# Patient Record
Sex: FEMALE | Race: AMERICAN INDIAN OR ALASKA NATIVE
[De-identification: names, ages, dates, MRNs, and addresses within clinical notes are randomized per-mention and may not be internally consistent; named-entity substitution may affect disease eponyms.]

---

## 2022-07-11 ENCOUNTER — HOSPITAL ENCOUNTER (EMERGENCY)
Dept: HOSPITAL 5 - ED | Age: 37
LOS: 1 days | Discharge: HOME | End: 2022-07-12
Payer: COMMERCIAL

## 2022-07-11 DIAGNOSIS — N28.9: ICD-10-CM

## 2022-07-11 DIAGNOSIS — N39.0: Primary | ICD-10-CM

## 2022-07-11 DIAGNOSIS — Z79.899: ICD-10-CM

## 2022-07-11 DIAGNOSIS — Z91.09: ICD-10-CM

## 2022-07-11 DIAGNOSIS — R10.9: ICD-10-CM

## 2022-07-11 DIAGNOSIS — G43.909: ICD-10-CM

## 2022-07-11 DIAGNOSIS — N83.202: ICD-10-CM

## 2022-07-11 DIAGNOSIS — Z91.013: ICD-10-CM

## 2022-07-11 PROCEDURE — 80053 COMPREHEN METABOLIC PANEL: CPT

## 2022-07-11 PROCEDURE — 87086 URINE CULTURE/COLONY COUNT: CPT

## 2022-07-11 PROCEDURE — 76830 TRANSVAGINAL US NON-OB: CPT

## 2022-07-11 PROCEDURE — 96361 HYDRATE IV INFUSION ADD-ON: CPT

## 2022-07-11 PROCEDURE — 76856 US EXAM PELVIC COMPLETE: CPT

## 2022-07-11 PROCEDURE — 36415 COLL VENOUS BLD VENIPUNCTURE: CPT

## 2022-07-11 PROCEDURE — 76705 ECHO EXAM OF ABDOMEN: CPT

## 2022-07-11 PROCEDURE — 96374 THER/PROPH/DIAG INJ IV PUSH: CPT

## 2022-07-11 PROCEDURE — 99284 EMERGENCY DEPT VISIT MOD MDM: CPT

## 2022-07-11 PROCEDURE — 85025 COMPLETE CBC W/AUTO DIFF WBC: CPT

## 2022-07-11 PROCEDURE — 81001 URINALYSIS AUTO W/SCOPE: CPT

## 2022-07-11 PROCEDURE — 71046 X-RAY EXAM CHEST 2 VIEWS: CPT

## 2022-07-11 PROCEDURE — 74176 CT ABD & PELVIS W/O CONTRAST: CPT

## 2022-07-11 PROCEDURE — 96375 TX/PRO/DX INJ NEW DRUG ADDON: CPT

## 2022-07-11 PROCEDURE — 83690 ASSAY OF LIPASE: CPT

## 2022-07-11 NOTE — XRAY REPORT
XR chest routine 2V



INDICATION / CLINICAL INFORMATION: sob and fever.



COMPARISON: None available.



FINDINGS: Findings in the chest are accentuated by body habitus and phase of inspiration.



SUPPORT DEVICES: None.

HEART /PULMONARY VASCULATURE: No significant abnormality. 

LUNGS / PLEURA: No acute pulmonary or pleural abnormality. No pneumothorax. 



ADDITIONAL FINDINGS: No significant additional findings.



IMPRESSION:

1. No acute findings.



Signer Name: Zev Mccauley MD 

Signed: 7/11/2022 8:18 PM

Workstation Name: [email or web address]

## 2022-07-12 VITALS — DIASTOLIC BLOOD PRESSURE: 72 MMHG | SYSTOLIC BLOOD PRESSURE: 131 MMHG

## 2022-07-12 LAB
ALBUMIN SERPL-MCNC: 3.9 G/DL (ref 3.9–5)
ALT SERPL-CCNC: 8 UNITS/L (ref 7–56)
BACTERIA #/AREA URNS HPF: (no result) /HPF
BASOPHILS # (AUTO): 0.1 K/MM3 (ref 0–0.1)
BASOPHILS NFR BLD AUTO: 0.5 % (ref 0–1.8)
BILIRUB UR QL STRIP: (no result)
BLOOD UR QL VISUAL: (no result)
BUN SERPL-MCNC: 8 MG/DL (ref 7–17)
BUN/CREAT SERPL: 8 %
CALCIUM SERPL-MCNC: 8.9 MG/DL (ref 8.4–10.2)
EOSINOPHIL # BLD AUTO: 0.1 K/MM3 (ref 0–0.4)
EOSINOPHIL NFR BLD AUTO: 1.4 % (ref 0–4.3)
HCT VFR BLD CALC: 32.6 % (ref 30.3–42.9)
HEMOLYSIS INDEX: 3
HGB BLD-MCNC: 10.3 GM/DL (ref 10.1–14.3)
LYMPHOCYTES # BLD AUTO: 2.2 K/MM3 (ref 1.2–5.4)
LYMPHOCYTES NFR BLD AUTO: 22.5 % (ref 13.4–35)
MCHC RBC AUTO-ENTMCNC: 32 % (ref 30–34)
MCV RBC AUTO: 77 FL (ref 79–97)
MONOCYTES # (AUTO): 0.7 K/MM3 (ref 0–0.8)
MONOCYTES % (AUTO): 6.5 % (ref 0–7.3)
MUCOUS THREADS #/AREA URNS HPF: (no result) /HPF
PH UR STRIP: 5 [PH] (ref 5–7)
PLATELET # BLD: 362 K/MM3 (ref 140–440)
RBC # BLD AUTO: 4.27 M/MM3 (ref 3.65–5.03)
RBC #/AREA URNS HPF: 35 /HPF (ref 0–6)
UROBILINOGEN UR-MCNC: 2 MG/DL (ref ?–2)
WBC #/AREA URNS HPF: 10 /HPF (ref 0–6)

## 2022-07-12 NOTE — EMERGENCY DEPARTMENT REPORT
ED General Adult HPI





- General


Chief complaint: Upper Respiratory Infection


Stated complaint: SEVER ABDOMINAL PAIN, NUMBNESS IN FINGERS


Time Seen by Provider: 07/12/22 07:23


Source: patient


Mode of arrival: Ambulatory


Limitations: No Limitations





- History of Present Illness


Initial comments: 





36-year-old female past medical history of a brain cyst diagnosed in 07, 

endometriosis, PCOS, renal disease "born with 1 kidney" and a heart murmur.  

Patient reports to the ER with complaints of abdominal pain all over with nausea

and vomiting that started yesterday around 10 AM.  Patient states that initially

when she came to the ER her pain was a 10 out of 10 the pain has now decreased 

to 6 out of 10.  Patient denies diarrhea.  Patient also reports that headache 

reports she has not taken her propanolol 80 mg today for her migraines.  Patient

denies chest pain, shortness of breath, weakness, dizziness.  No other acute 

concerns at this time.


Severity scale (0 -10): 9





- Related Data


                                  Previous Rx's











 Medication  Instructions  Recorded  Last Taken  Type


 


Cefuroxime Axetil [Cefuroxime] 500 mg PO BID 7 Days #14 tab 07/12/22 Unknown Rx


 


Ondansetron [Zofran Odt] 4 mg PO Q12HR PRN 4 Days #8 07/12/22 Unknown Rx





 tab.rapdis   


 


traMADoL [Ultram 50 MG tab] 50 mg PO Q6HR PRN 3 Days #10 tablet 07/12/22 Unknown

Rx











                                    Allergies











Allergy/AdvReac Type Severity Reaction Status Date / Time


 


iodine Allergy  Anaphylaxis Verified 07/11/22 18:27


 


shellfish derived Allergy  Anaphylaxis Verified 07/11/22 18:26














ED Review of Systems


ROS: 


Stated complaint: SEVER ABDOMINAL PAIN, NUMBNESS IN FINGERS


Other details as noted in HPI





Comment: All other systems reviewed and negative


Gastrointestinal: abdominal pain, nausea, vomiting.  denies: diarrhea, 

constipation, hematemesis





ED Past Medical Hx





- Past Medical History


Previous Medical History?: Yes


Hx Renal Disease: Yes


Hx Headaches / Migraines: Yes (migraine)


Additional medical history: brain cyst, heart murmur, endometriosis, PCOS,





- Surgical History


Additional Surgical History: 9 GYN SX, hernia repair





- Social History


Smoking Status: Never Smoker





- Medications


Home Medications: 


                                Home Medications











 Medication  Instructions  Recorded  Confirmed  Last Taken  Type


 


Cefuroxime Axetil [Cefuroxime] 500 mg PO BID 7 Days #14 tab 07/12/22  Unknown Rx


 


Ondansetron [Zofran Odt] 4 mg PO Q12HR PRN 4 Days #8 07/12/22  Unknown Rx





 tab.rapdis    


 


traMADoL [Ultram 50 MG tab] 50 mg PO Q6HR PRN 3 Days #10 tablet 07/12/22  

Unknown Rx














ED Physical Exam





- General


Limitations: No Limitations


General appearance: alert, in no apparent distress





- Head


Head exam: Present: atraumatic, normocephalic





- Eye


Eye exam: Present: normal appearance





- ENT


ENT exam: Present: mucous membranes moist





- Neck


Neck exam: Present: normal inspection





- Respiratory


Respiratory exam: Present: normal lung sounds bilaterally.  Absent: respiratory 

distress





- Cardiovascular


Cardiovascular Exam: Present: regular rate, normal rhythm.  Absent: systolic 

murmur, diastolic murmur, rubs, gallop





- GI/Abdominal


GI/Abdominal exam: Present: soft, tenderness, diminished bowel sounds, other (No

acute abdominal signs.).  Absent: distended, rebound, rigid





- Extremities Exam


Extremities exam: Present: normal inspection





- Back Exam


Back exam: Present: normal inspection





- Neurological Exam


Neurological exam: Present: alert, oriented X3





- Psychiatric


Psychiatric exam: Present: normal affect, normal mood





- Skin


Skin exam: Present: warm, dry, intact, normal color.  Absent: rash





ED Course


                                   Vital Signs











  07/11/22 07/12/22 07/12/22





  18:19 05:29 09:41


 


Temperature 100.6 F H 98.5 F 


 


Pulse Rate 88 92 H 62


 


Respiratory 22 19 





Rate   


 


Blood Pressure 151/87  135/74


 


Blood Pressure  116/58 





[Left]   


 


O2 Sat by Pulse 100 95 





Oximetry   














  07/12/22 07/12/22





  10:53 12:08


 


Temperature  98.9 F


 


Pulse Rate 59 L 56 L


 


Respiratory 14 14





Rate  


 


Blood Pressure  


 


Blood Pressure 139/59 131/72





[Left]  


 


O2 Sat by Pulse 100 96





Oximetry  














ED Medical Decision Making





- Lab Data


Result diagrams: 


                                 07/12/22 05:24





                                 07/12/22 05:24





- Radiology Data





chest -  IMPRESSION:  


 1. No acute findings.  





ct abdominal/ pelvic -  IMPRESSION:  


 1. Negative for obstruction or localized inflammation.  


 2. Previous right nephrectomy.  


 3. Probable small cyst left ovary. 





Abdominal U/S -  IMPRESSION:  


 1. No significant sonographic abnormality of the right upper quadrant.





pelvis US -  IMPRESSION:  


  Limited difficult exam by the technologist.  


 2 simple appearing left ovarian cysts are identified as described.  


 There is suggestion of complex thickening or mass lesion in the lower uterine 

segment or possibly 


the endocervical canal as described above. Although this may represent blood 

products, A mass lesion


or endometrioma is difficult to exclude. Given the complex history of this 

patient, further 


evaluation with MRI pelvis with and without contrast should be considered.  





- Medical Decision Making





36-year-old female past medical history of a brain cyst diagnosed in 07, 

endometriosis, PCOS, renal disease "born with 1 kidney" and a heart murmur.  

Patient reports to the ER with complaints of abdominal pain all over with nausea

 and vomiting that started yesterday around 10 AM.  Patient states that 

initially when she came to the ER her pain was a 10 out of 10 the pain has now 

decreased to 6 out of 10.  Patient denies diarrhea.  Patient also reports that 

headache reports she has not taken her propanolol 80 mg today for her migraines.

  Patient denies chest pain, shortness of breath, weakness, dizziness.  No other

 acute concerns at this time.





On physical exam there is abdominal tenderness with lower abdominal pain and 

greatest pain and compared to upper abdominal pain.  No acute abdominal signs 

noted on physical exam.  Bowels are hypoactive.  No pulsating mass no organ 

enlargement noted.





CT was ordered, ultrasound, blood work.  Pain medicine and Zofran medicine 

ordered.  Propanolol 80 mg was ordered for patient's headache per patient's 

request.





Patient reports feeling better after receiving IV medication.


Patient reports increasing pains around a 4 out of 10.  Patient updated on her 

CT and ultrasound results and below work.  Patient reports that she is aware 

that she does have a cyst on her ovaries.


Patient informed that she should follow-up with her primary care provider as 

well as her OB/GYN for an MRI for further evaluation of her uterus.  Patient 

agrees with plan of care and verbalized understanding.


Patient stable for discharge home.


Patient informed that if symptoms continue or worsen to report back to the ER.

















Labs











  07/12/22 07/12/22 07/12/22





  05:24 05:24 05:31


 


WBC  10.0  


 


RBC  4.27  


 


Hgb  10.3  


 


Hct  32.6  


 


MCV  77 L  


 


MCH  24 L  


 


MCHC  32  


 


RDW  16.9 H  


 


Plt Count  362  


 


Lymph % (Auto)  22.5  


 


Mono % (Auto)  6.5  


 


Eos % (Auto)  1.4  


 


Baso % (Auto)  0.5  


 


Lymph # (Auto)  2.2  


 


Mono # (Auto)  0.7  


 


Eos # (Auto)  0.1  


 


Baso # (Auto)  0.1  


 


Seg Neutrophils %  69.1  


 


Seg Neutrophils #  6.9  


 


Sodium   138 


 


Potassium   3.8 


 


Chloride   102.2 


 


Carbon Dioxide   25 


 


Anion Gap   15 


 


BUN   8 


 


Creatinine   1.0 


 


Estimated GFR   > 60 


 


BUN/Creatinine Ratio   8 


 


Glucose   108 H 


 


Calcium   8.9 


 


Total Bilirubin   0.90 


 


AST   12 


 


ALT   8 


 


Alkaline Phosphatase   66 


 


Total Protein   7.6 


 


Albumin   3.9 


 


Albumin/Globulin Ratio   1.1 


 


Lipase   19 


 


Urine Color    Yellow


 


Urine Turbidity    Slightly-cloudy


 


Urine pH    5.0


 


Ur Specific Gravity    1.025


 


Urine Protein    30 mg/dl


 


Urine Glucose (UA)    Neg


 


Urine Ketones    Neg


 


Urine Blood    Mod


 


Urine Nitrite    Neg


 


Urine Bilirubin    Neg


 


Urine Urobilinogen    2.0


 


Ur Leukocyte Esterase    Mod


 


Urine WBC (Auto)    10.0 H


 


Urine RBC (Auto)    35.0


 


U Epithel Cells (Auto)    25.0 H


 


Urine Bacteria (Auto)    1+


 


Urine Mucus    1+











                                   Vital Signs











  07/11/22 07/12/22 07/12/22





  18:19 05:29 09:41


 


Temperature 100.6 F H 98.5 F 


 


Pulse Rate 88 92 H 62


 


Respiratory 22 19 





Rate   


 


Blood Pressure 151/87  135/74


 


Blood Pressure  116/58 





[Left]   


 


O2 Sat by Pulse 100 95 





Oximetry   














  07/12/22 07/12/22





  10:53 12:08


 


Temperature  98.9 F


 


Pulse Rate 59 L 56 L


 


Respiratory 14 14





Rate  


 


Blood Pressure  


 


Blood Pressure 139/59 131/72





[Left]  


 


O2 Sat by Pulse 100 96





Oximetry  











Critical care attestation.: 


If time is entered above; I have spent that time in minutes in the direct care 

of this critically ill patient, excluding procedure time.








ED Disposition


Clinical Impression: 


 Acute lower UTI, Cyst of ovary, left





Abdominal pain


Qualifiers:


 Abdominal location: lower abdomen, unspecified Qualified Code(s): R10.30 - 

Lower abdominal pain, unspecified





Disposition: 01 HOME / SELF CARE / HOMELESS


Is pt being admited?: No


Condition: Stable


Instructions:  Abdominal Pain, Adult, Urinary Tract Infection, Adult, Ovarian 

Cyst, Easy-to-Read


Prescriptions: 


Cefuroxime Axetil [Cefuroxime] 500 mg PO BID 7 Days #14 tab


traMADoL [Ultram 50 MG tab] 50 mg PO Q6HR PRN 3 Days #10 tablet


 PRN Reason: Pain


Ondansetron [Zofran Odt] 4 mg PO Q12HR PRN 4 Days #8 tab.rapdis


 PRN Reason: Nausea And Vomiting


Referrals: 


SHIRLENE WELLS MD [Primary Care Provider] - 3-5 Days

## 2022-07-12 NOTE — ULTRASOUND REPORT
ULTRASOUND ABDOMEN, LIMITED



INDICATION / CLINICAL INFORMATION: pelvic pain - lower abdominal pain - pubic area.

 

COMPARISON: None available.



FINDINGS:

PANCREAS: Visualized portion shows no significant abnormality.

LIVER: No significant abnormality. Normal hepatopedal blood flow in the main portal vein.

GALLBLADDER: No significant abnormality.  

BILE DUCTS: No significant abnormality. Common bile duct measures 6 4 mm. 



FREE FLUID: None.

ADDITIONAL FINDINGS: None.



IMPRESSION:

1. No significant sonographic abnormality of the right upper quadrant.



Signer Name: Oz Christina MD 

Signed: 7/12/2022 9:31 AM

Workstation Name: [email or web address]

## 2022-07-12 NOTE — CAT SCAN REPORT
CT ABDOMEN AND PELVIS WITHOUT CONTRAST



INDICATION / CLINICAL INFORMATION: r/o obstruction renal stone.



TECHNIQUE: Axial CT images were obtained through the abdomen and pelvis without IV contrast.  All CT 
scans at this location are performed using CT dose reduction for ALARA by means of automated exposure
 control. 



COMPARISON: None available.



FINDINGS:



LOWER CHEST: No significant abnormality.

LIVER: No significant abnormality.

GALLBLADDER: No significant abnormality.  

BILE DUCTS: No significant abnormality.

PANCREAS: No significant abnormality.

SPLEEN: No significant abnormality.

ADRENALS: No significant abnormality.

RIGHT KIDNEY / URETER: Absent.

LEFT KIDNEY / URETER: No significant abnormality.



STOMACH / SMALL BOWEL: No significant abnormality. 

COLON: No significant abnormality. 

APPENDIX: Nonvisualized.  

PERITONEUM: No free fluid. No free air. No fluid collection.

LYMPH NODES: No significant adenopathy.

VASCULAR STRUCTURES: No significant abnormality. 



URINARY BLADDER: No significant abnormality.

REPRODUCTIVE ORGANS: Probable small cyst left ovary.



ADDITIONAL FINDINGS: None.



SKELETAL SYSTEM: No significant abnormality.



IMPRESSION:

1. Negative for obstruction or localized inflammation.

2. Previous right nephrectomy.

3. Probable small cyst left ovary.



Signer Name: Black Pickering MD 

Signed: 7/12/2022 7:50 AM

Workstation Name: [id number]

## 2022-07-12 NOTE — ULTRASOUND REPORT
ULTRASOUND PELVIS  COMPLETE

ULTRASOUND TRANSVAGINAL



INDICATION / CLINICAL INFORMATION:   pelvic pain. 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
 was surgically corrected. Patient also reports a history of endometriosis  



TECHNIQUE:  Transabdominal and Transvaginal.

Duplex Color Doppler used: Yes. 



COMPARISON:  CT abdomen pelvis without contrast performed earlier today



FINDINGS:

UTERUS: Present.  

- Appearance (if present): Imaging of the uterus is limited on transvaginal imaging due to body habit
us. On transabdominal imaging, the uterus appears mildly enlarged and anteverted.

- Size in cm (if present): 11 x 5 x 5 cm on transabdominal imaging. 

- Endometrial Complex (if present): The endometrial complex is poorly imaged on both transabdominal a
nd transvaginal images. The endometrium appears to measure 4.8 mm on the transabdominal images. There
 is suggestion of complex thickening of the lower endometrium which possibly extends into the endocer
vical canal. The transvaginal images demonstrate a complex hypoechoic material in the lower uterine s
egment and endocervical canal measuring up to 2.6 cm in thickness. No convincing internal flow on col
or Doppler imaging. May represent blood products although a mass lesion is difficult to exclude.

- Additional findings: None.



RIGHT ADNEXA: No significant ovarian cyst or mass. Normal color Doppler blood flow. The right ovary m
easures 3.1 x 2.0 x 2.5 cm.  



LEFT ADNEXA: There are 2 simple appearing cysts in the left ovary measuring 1.1 cm and 1.3 cm. Normal
 color Doppler blood flow. The left ovary measures 2.5 x 2.1 x 1.5 cm.  



URINARY BLADDER: No significant abnormality. 

FREE FLUID: None.

ADDITIONAL FINDINGS: None.



IMPRESSION:

 Limited difficult exam by the technologist.

2 simple appearing left ovarian cysts are identified as described.

There is suggestion of complex thickening or mass lesion in the lower uterine segment or possibly the
 endocervical canal as described above. Although this may represent blood products, A mass lesion or 
endometrioma is difficult to exclude. Given the complex history of this patient, further evaluation w
ith MRI pelvis with and without contrast should be considered.



Signer Name: Chriss Jean Jr, MD 

Signed: 7/12/2022 9:35 AM

Workstation Name: [id number]